# Patient Record
Sex: MALE | Race: OTHER | NOT HISPANIC OR LATINO | URBAN - METROPOLITAN AREA
[De-identification: names, ages, dates, MRNs, and addresses within clinical notes are randomized per-mention and may not be internally consistent; named-entity substitution may affect disease eponyms.]

---

## 2020-05-13 ENCOUNTER — HOSPITAL ENCOUNTER (EMERGENCY)
Facility: HOSPITAL | Age: 25
Discharge: HOME | End: 2020-05-14
Attending: EMERGENCY MEDICINE
Payer: MEDICAID

## 2020-05-13 DIAGNOSIS — S01.21XA LACERATION OF NOSE, INITIAL ENCOUNTER: Primary | ICD-10-CM

## 2020-05-13 PROCEDURE — 99282 EMERGENCY DEPT VISIT SF MDM: CPT | Mod: 25

## 2020-05-13 PROCEDURE — 12011 RPR F/E/E/N/L/M 2.5 CM/<: CPT

## 2020-05-14 VITALS
DIASTOLIC BLOOD PRESSURE: 89 MMHG | HEART RATE: 76 BPM | SYSTOLIC BLOOD PRESSURE: 124 MMHG | RESPIRATION RATE: 20 BRPM | BODY MASS INDEX: 26.37 KG/M2 | OXYGEN SATURATION: 99 % | WEIGHT: 174 LBS | TEMPERATURE: 99 F | HEIGHT: 68 IN

## 2020-05-14 NOTE — DISCHARGE INSTRUCTIONS
Skin glue peels off on its own within 5 to 10 days.     Follow-up with your doctor.  Return to the emergency department for new or worsening symptoms.

## 2020-05-14 NOTE — ED PROVIDER NOTES
Encounter Date: 5/13/2020       History     Chief Complaint   Patient presents with    Laceration     Pt reports he accidentally cut his nose with a razor while shaving. Incident occurred approx 10minutes ago.  Bleeding is controlled in triage.     CC:  Laceration    HPI:  24-year-old male with no medical problems presenting for evaluation laceration to the nose.  Patient was attempting to shave his face with a straight razor when he accidentally cut the tip of his nose.  Bleeding controlled prior to arrival.  Tetanus is up-to-date.  No other complaints at this time.    The history is provided by the patient. No  was used.     Review of patient's allergies indicates:  No Known Allergies  History reviewed. No pertinent past medical history.  History reviewed. No pertinent surgical history.  History reviewed. No pertinent family history.  Social History     Tobacco Use    Smoking status: Never Smoker    Smokeless tobacco: Never Used   Substance Use Topics    Alcohol use: Never     Frequency: Never    Drug use: Never     Review of Systems   Constitutional: Negative for fever.   HENT: Negative for sore throat.    Respiratory: Negative for shortness of breath.    Cardiovascular: Negative for chest pain.   Gastrointestinal: Negative for nausea.   Genitourinary: Negative for dysuria.   Musculoskeletal: Negative for back pain.   Skin: Positive for wound. Negative for rash.   Neurological: Negative for weakness.   Hematological: Does not bruise/bleed easily.       Physical Exam     Initial Vitals [05/13/20 2334]   BP Pulse Resp Temp SpO2   129/78 85 20 98.5 °F (36.9 °C) 100 %      MAP       --         Physical Exam    Vitals reviewed.  Constitutional: He appears well-developed and well-nourished. He is not diaphoretic.  Non-toxic appearance. He does not have a sickly appearance. He does not appear ill. No distress.   HENT:   Head: Normocephalic and atraumatic.   Right Ear: External ear normal.   Left  Ear: External ear normal.   Nose:       Neck: Normal range of motion.   Pulmonary/Chest: No respiratory distress.   Musculoskeletal: Normal range of motion.   Neurological: He is alert and oriented to person, place, and time. GCS eye subscore is 4. GCS verbal subscore is 5. GCS motor subscore is 6.   Skin: Skin is warm, dry and intact. No rash noted. No erythema.   Psychiatric: He has a normal mood and affect. Thought content normal.         ED Course   Lac Repair  Date/Time: 5/14/2020 12:29 AM  Performed by: Lore Liu NP  Authorized by: Uche Catherine MD   Body area: head/neck  Location details: nose  Laceration length: 1 cm  Foreign bodies: no foreign bodies  Tendon involvement: none  Nerve involvement: none  Irrigation solution: saline  Irrigation method: jet lavage  Amount of cleaning: standard  Debridement: none  Degree of undermining: none  Skin closure: glue  Patient tolerance: Patient tolerated the procedure well with no immediate complications        Labs Reviewed - No data to display       Imaging Results    None          Medical Decision Making:   ED Management:  This is an evaluation of a 24 y.o. male that presents to the Emergency Department for a Laceration. Physical Exam shows a non-toxic, afebrile, and well appearing male. There is a laceration to nose. There is no surrounding erythema or area of increased warmth. It is superficial. Not through and through. The wound was irrigated and visually inspected prior to closure. No visible foreign bodies noted. Vital Signs Are Reassuring. Tetanus is up to date.     My overall impression is Laceration. I considered, but at this time, do not suspect cellulitis, compartment syndrome, underlying fracture, or suspect any retained foreign body at this time.     D/C Information: Laceration/Wound Care/Suture Removal instructions given. The diagnosis, treatment plan, instructions for follow-up and reevaluation with his PCP.                                  Clinical Impression:       ICD-10-CM ICD-9-CM   1. Laceration of nose, initial encounter S01.21XA 873.20         Disposition:   Disposition: Discharged  Condition: Stable     ED Disposition Condition    Discharge Stable        ED Prescriptions     None        Follow-up Information     Follow up With Specialties Details Why Contact Info    [facility]  Schedule an appointment as soon as possible for a visit in 1 day For follow-up if you do not have a primary care doctor [address] [place] [address]  [phone]      [facility] Emergency [facility] Go to  If symptoms worsen [address] [facility] [id number] [phone]                                     Lore Liu NP  05/14/20 0031